# Patient Record
Sex: MALE | Race: WHITE | NOT HISPANIC OR LATINO | Employment: FULL TIME | URBAN - METROPOLITAN AREA
[De-identification: names, ages, dates, MRNs, and addresses within clinical notes are randomized per-mention and may not be internally consistent; named-entity substitution may affect disease eponyms.]

---

## 2020-11-13 ENCOUNTER — TRANSCRIBE ORDERS (OUTPATIENT)
Dept: ADMINISTRATIVE | Facility: HOSPITAL | Age: 40
End: 2020-11-13

## 2020-11-13 ENCOUNTER — HOSPITAL ENCOUNTER (OUTPATIENT)
Dept: RADIOLOGY | Facility: HOSPITAL | Age: 40
Discharge: HOME | End: 2020-11-13
Attending: INTERNAL MEDICINE
Payer: COMMERCIAL

## 2020-11-13 DIAGNOSIS — Z87.09 PERSONAL HISTORY OF UNSPECIFIED DISEASE OF RESPIRATORY SYSTEM: ICD-10-CM

## 2020-11-13 DIAGNOSIS — Z87.09 PERSONAL HISTORY OF UNSPECIFIED DISEASE OF RESPIRATORY SYSTEM: Primary | ICD-10-CM

## 2020-11-13 PROCEDURE — 71046 X-RAY EXAM CHEST 2 VIEWS: CPT

## 2021-11-04 ENCOUNTER — HOSPITAL ENCOUNTER (OUTPATIENT)
Dept: RADIOLOGY | Facility: HOSPITAL | Age: 41
Discharge: HOME | End: 2021-11-04
Payer: COMMERCIAL

## 2021-11-04 DIAGNOSIS — M54.50 LOW BACK PAIN, UNSPECIFIED BACK PAIN LATERALITY, UNSPECIFIED CHRONICITY, UNSPECIFIED WHETHER SCIATICA PRESENT: ICD-10-CM

## 2021-11-04 PROCEDURE — 72100 X-RAY EXAM L-S SPINE 2/3 VWS: CPT

## 2021-11-04 PROCEDURE — 73502 X-RAY EXAM HIP UNI 2-3 VIEWS: CPT

## 2021-11-30 VITALS — WEIGHT: 187.8 LBS | RESPIRATION RATE: 18 BRPM | HEIGHT: 70 IN | BODY MASS INDEX: 26.88 KG/M2

## 2021-11-30 DIAGNOSIS — M16.11 PRIMARY OSTEOARTHRITIS OF ONE HIP, RIGHT: Primary | ICD-10-CM

## 2021-11-30 PROCEDURE — 99204 OFFICE O/P NEW MOD 45 MIN: CPT | Performed by: ORTHOPAEDIC SURGERY

## 2021-11-30 RX ORDER — ROSUVASTATIN CALCIUM 10 MG/1
TABLET, COATED ORAL
COMMUNITY
Start: 2021-11-23

## 2021-11-30 RX ORDER — ERGOCALCIFEROL 1.25 MG/1
CAPSULE ORAL
COMMUNITY
Start: 2021-11-29

## 2021-11-30 RX ORDER — METHOCARBAMOL 750 MG/1
TABLET, FILM COATED ORAL EVERY 8 HOURS SCHEDULED
COMMUNITY
Start: 2021-10-06

## 2021-12-07 ENCOUNTER — OFFICE VISIT (OUTPATIENT)
Dept: OBGYN CLINIC | Facility: CLINIC | Age: 41
End: 2021-12-07
Payer: COMMERCIAL

## 2021-12-07 VITALS
HEART RATE: 85 BPM | WEIGHT: 191.4 LBS | DIASTOLIC BLOOD PRESSURE: 80 MMHG | SYSTOLIC BLOOD PRESSURE: 125 MMHG | HEIGHT: 70 IN | BODY MASS INDEX: 27.4 KG/M2

## 2021-12-07 DIAGNOSIS — M25.859 FEMORAL ACETABULAR IMPINGEMENT: ICD-10-CM

## 2021-12-07 DIAGNOSIS — M16.11 PRIMARY OSTEOARTHRITIS OF ONE HIP, RIGHT: Primary | ICD-10-CM

## 2021-12-07 PROCEDURE — 99213 OFFICE O/P EST LOW 20 MIN: CPT | Performed by: ORTHOPAEDIC SURGERY

## 2021-12-20 ENCOUNTER — HOSPITAL ENCOUNTER (OUTPATIENT)
Dept: RADIOLOGY | Facility: HOSPITAL | Age: 41
Discharge: HOME | End: 2021-12-20
Attending: ORTHOPAEDIC SURGERY | Admitting: RADIOLOGY
Payer: COMMERCIAL

## 2021-12-20 ENCOUNTER — HOSPITAL ENCOUNTER (OUTPATIENT)
Dept: MRI IMAGING | Facility: HOSPITAL | Age: 41
Discharge: HOME | End: 2021-12-20
Attending: ORTHOPAEDIC SURGERY
Payer: COMMERCIAL

## 2021-12-20 DIAGNOSIS — M25.859 FEMORAL ACETABULAR IMPINGEMENT: ICD-10-CM

## 2021-12-20 DIAGNOSIS — M16.11 PRIMARY OSTEOARTHRITIS OF ONE HIP, RIGHT: ICD-10-CM

## 2021-12-20 PROCEDURE — 73722 MRI JOINT OF LWR EXTR W/DYE: CPT

## 2021-12-20 PROCEDURE — 27095 INJECTION FOR HIP X-RAY: CPT

## 2021-12-20 PROCEDURE — 77002 NEEDLE LOCALIZATION BY XRAY: CPT

## 2021-12-20 PROCEDURE — G1004 CDSM NDSC: HCPCS

## 2021-12-20 PROCEDURE — A9585 GADOBUTROL INJECTION: HCPCS | Performed by: ORTHOPAEDIC SURGERY

## 2021-12-20 RX ORDER — SODIUM CHLORIDE 9 MG/ML
5 INJECTION INTRAVENOUS
Status: COMPLETED | OUTPATIENT
Start: 2021-12-20 | End: 2021-12-20

## 2021-12-20 RX ORDER — LIDOCAINE HYDROCHLORIDE 10 MG/ML
10 INJECTION, SOLUTION EPIDURAL; INFILTRATION; INTRACAUDAL; PERINEURAL
Status: COMPLETED | OUTPATIENT
Start: 2021-12-20 | End: 2021-12-20

## 2021-12-20 RX ADMIN — GADOBUTROL 0.2 ML: 604.72 INJECTION INTRAVENOUS at 11:01

## 2021-12-20 RX ADMIN — LIDOCAINE HYDROCHLORIDE 7 ML: 10 INJECTION, SOLUTION EPIDURAL; INFILTRATION; INTRACAUDAL; PERINEURAL at 11:02

## 2021-12-20 RX ADMIN — SODIUM CHLORIDE 12 ML: 9 INJECTION, SOLUTION INTRAMUSCULAR; INTRAVENOUS; SUBCUTANEOUS at 11:01

## 2021-12-20 RX ADMIN — IOHEXOL 3 ML: 300 INJECTION, SOLUTION INTRAVENOUS at 11:01

## 2021-12-28 VITALS
BODY MASS INDEX: 27.35 KG/M2 | WEIGHT: 191 LBS | DIASTOLIC BLOOD PRESSURE: 82 MMHG | HEIGHT: 70 IN | SYSTOLIC BLOOD PRESSURE: 119 MMHG | HEART RATE: 101 BPM

## 2021-12-28 DIAGNOSIS — M16.11 PRIMARY OSTEOARTHRITIS OF ONE HIP, RIGHT: Primary | ICD-10-CM

## 2021-12-28 PROCEDURE — 99213 OFFICE O/P EST LOW 20 MIN: CPT | Performed by: ORTHOPAEDIC SURGERY

## 2021-12-28 NOTE — TELEPHONE ENCOUNTER
Patient calls in stating that he saw Dr Dimitry Gil today and Dr Dimitry Gil advised him to call [facility] and advise them that Dr Dimitry Gil said it is okay for pt to receive cortisone injections  Pt states that they told him that is not how it works  Pt would need to have a consult with Dr Mike Brown  However, pt states he has already seen Dr Dimitry Gil twice  [facility] said that if there is not an appointment with Dr Dimitry Gil for a consult or a procedure, then they would have to meet with patient before he is able to get the cortisone injections  Patient is scheduled with [facility] for 1/12  They asked pt to see if Dr Dimitry Gil can change the visit to procedure  Please advise       Pt's cb #[phone]

## 2022-01-05 ENCOUNTER — TELEPHONE (OUTPATIENT)
Dept: OBGYN CLINIC | Facility: MEDICAL CENTER | Age: 42
End: 2022-01-05

## 2022-01-05 NOTE — TELEPHONE ENCOUNTER
Spoke to Stephanie Julian in [place]  She stated she will take care of this for the patient  What we have ordered for injection is fine it just needs to be sent to procedure scheduling  Thanks!

## 2022-01-05 NOTE — TELEPHONE ENCOUNTER
Marcelo Adjutant -Dr Hernan Infante nurse called stating that this patient is only have an injection- pls see Dr Ena Menezes note below and referral in pt chart

## 2022-01-05 NOTE — TELEPHONE ENCOUNTER
Patient is calling about the injection and here is his message and can someone call him back about this message  This is too much confusion on this message, can someone please call this patient to make sure he is ok to receive his shot as scheduled   # [phone]      "I have my pain management appointment   with Ambreen Adamson MD on January 12 at 9:30am  I was told at my appointment with you to call and request that it be the cortisone injection instead of a consult with the injection at a later date since you and I had already went over this option  I called but was told the only way that I could do this (get the shot without a consult with Dr Lee Ann Hernandez) was that if you requested the procedure   Please let them know that we have already gone over this and that the procedure is requested       Thank you,  Cordell Miller"

## 2022-01-06 NOTE — TELEPHONE ENCOUNTER
Pt called in to schedule a cortisone shot with Dr Estela Mcfarland as per Dr Ena Menezes  Please be advised thank you    Pt can be reached @ [phone]

## 2022-01-07 NOTE — TELEPHONE ENCOUNTER
Pt aware that [facility] is waiting to hear from insurance -once approval has been given, [facility] will call to schedule

## 2022-01-10 NOTE — TELEPHONE ENCOUNTER
Patient inquiring why does he need approval for injection?      Please advise    Thank you     [phone]

## 2022-01-11 NOTE — TELEPHONE ENCOUNTER
S/w pt and scheduled Right Hip injection for 1/13/22 9 am  Gave pre procedure instructions and masking//vaccine policy

## 2022-01-13 ENCOUNTER — HOSPITAL ENCOUNTER (OUTPATIENT)
Dept: RADIOLOGY | Facility: MEDICAL CENTER | Age: 42
Discharge: HOME | End: 2022-01-13
Attending: PHYSICAL MEDICINE & REHABILITATION
Payer: COMMERCIAL

## 2022-01-13 VITALS
OXYGEN SATURATION: 98 % | SYSTOLIC BLOOD PRESSURE: 125 MMHG | DIASTOLIC BLOOD PRESSURE: 81 MMHG | TEMPERATURE: 97.7 F | RESPIRATION RATE: 18 BRPM | HEART RATE: 74 BPM

## 2022-01-13 DIAGNOSIS — M16.11 PRIMARY OSTEOARTHRITIS OF RIGHT HIP: ICD-10-CM

## 2022-01-13 PROCEDURE — 20610 DRAIN/INJ JOINT/BURSA W/O US: CPT | Performed by: PHYSICAL MEDICINE & REHABILITATION

## 2022-01-13 PROCEDURE — 77002 NEEDLE LOCALIZATION BY XRAY: CPT | Performed by: PHYSICAL MEDICINE & REHABILITATION

## 2022-01-13 RX ORDER — LIDOCAINE HYDROCHLORIDE 10 MG/ML
5 INJECTION, SOLUTION EPIDURAL; INFILTRATION; INTRACAUDAL; PERINEURAL ONCE
Status: COMPLETED | OUTPATIENT
Start: 2022-01-13 | End: 2022-01-13

## 2022-01-13 RX ORDER — BUPIVACAINE HCL/PF 2.5 MG/ML
10 VIAL (ML) INJECTION ONCE
Status: COMPLETED | OUTPATIENT
Start: 2022-01-13 | End: 2022-01-13

## 2022-01-13 RX ORDER — METHYLPREDNISOLONE ACETATE 80 MG/ML
80 INJECTION, SUSPENSION INTRA-ARTICULAR; INTRALESIONAL; INTRAMUSCULAR; PARENTERAL; SOFT TISSUE ONCE
Status: COMPLETED | OUTPATIENT
Start: 2022-01-13 | End: 2022-01-13

## 2022-01-13 RX ADMIN — IOHEXOL 1 ML: 300 INJECTION, SOLUTION INTRAVENOUS at 11:47

## 2022-01-13 RX ADMIN — Medication 3 ML: at 11:47

## 2022-01-13 RX ADMIN — LIDOCAINE HYDROCHLORIDE 2 ML: 10 INJECTION, SOLUTION EPIDURAL; INFILTRATION; INTRACAUDAL; PERINEURAL at 11:46

## 2022-01-13 RX ADMIN — METHYLPREDNISOLONE ACETATE 80 MG: 80 INJECTION, SUSPENSION INTRA-ARTICULAR; INTRALESIONAL; INTRAMUSCULAR; PARENTERAL; SOFT TISSUE at 11:47

## 2022-01-13 NOTE — TELEPHONE ENCOUNTER
Patient coming for hip injection today at 11 am with Dr Joleen Bhat at [facility]  Pt aware and agreeable

## 2022-01-13 NOTE — TELEPHONE ENCOUNTER
Patient is at [facility] waiting because he had to arrange a ride  Needs to know as soon as possible   ty

## 2022-01-13 NOTE — H&P
History of Present Illness: The patient is a 39 y o  male who presents with complaints of right hip pain    Patient Active Problem List   Diagnosis    Primary osteoarthritis of one hip, right       Past Medical History:   Diagnosis Date    Fractures     wrist    Hypercholesteremia        Past Surgical History:   Procedure Laterality Date    FL INJECTION RIGHT HIP (ARTHROGRAM)  12/20/2021    KNEE SURGERY Right     meniscus repair         Current Outpatient Medications:     ergocalciferol (VITAMIN D2) 50,000 units, , Disp: , Rfl:     methocarbamol (ROBAXIN) 750 mg tablet, every 8 (eight) hours  , Disp: , Rfl:     rosuvastatin (CRESTOR) 10 MG tablet, , Disp: , Rfl:     Current Facility-Administered Medications:     bupivacaine (PF) (MARCAINE) 0 25 % injection 10 mL, 10 mL, Intra-articular, Once, Hope Heal, DO    iohexol (OMNIPAQUE) 300 mg/mL injection 50 mL, 50 mL, Intra-articular, Once, Hope Heal, DO    lidocaine (PF) (XYLOCAINE-MPF) 1 % injection 5 mL, 5 mL, Infiltration, Once, Hope Heal, DO    methylPREDNISolone acetate (DEPO-MEDROL) injection 80 mg, 80 mg, Intra-articular, Once, Hope Heal, DO    No Known Allergies    Physical Exam: There were no vitals filed for this visit  General: Awake, Alert, Oriented x 3, Mood and affect appropriate  Respiratory: Respirations even and unlabored  Cardiovascular: Peripheral pulses intact; no edema  Musculoskeletal Exam:  Tenderness to palpation right lateral hip and medial groin    ASA Score: 2         Assessment:   1   Primary osteoarthritis of right hip        Plan: Right Hip intra-articular injection of cortisone under fluoro

## 2022-01-13 NOTE — DISCHARGE INSTRUCTIONS

## 2022-01-13 NOTE — TELEPHONE ENCOUNTER
Patient was scheduled today but procedure was cancelled due to no RAD [facility] per , he is not interested in driving back to [facility] would like to speak with Dr Eusebia Young office to be scheduled at another location to get the procedure done today   I transferred to 0    Please advise     Thank you     [phone]

## 2022-01-20 ENCOUNTER — TELEPHONE (OUTPATIENT)
Dept: PAIN MEDICINE | Facility: CLINIC | Age: 42
End: 2022-01-20

## 2022-01-20 NOTE — TELEPHONE ENCOUNTER
1st attempt    Left message for pt to call back with % of improvement and /10 pain level    R hip inj   1/13/22

## 2022-02-12 DIAGNOSIS — M16.11 PRIMARY OSTEOARTHRITIS OF ONE HIP, RIGHT: Primary | ICD-10-CM

## 2022-02-15 ENCOUNTER — APPOINTMENT (OUTPATIENT)
Dept: RADIOLOGY | Facility: CLINIC | Age: 42
End: 2022-02-15
Payer: COMMERCIAL

## 2022-02-15 ENCOUNTER — OFFICE VISIT (OUTPATIENT)
Dept: OBGYN CLINIC | Facility: CLINIC | Age: 42
End: 2022-02-15
Payer: COMMERCIAL

## 2022-02-15 VITALS
SYSTOLIC BLOOD PRESSURE: 124 MMHG | BODY MASS INDEX: 26.11 KG/M2 | WEIGHT: 182.4 LBS | HEIGHT: 70 IN | DIASTOLIC BLOOD PRESSURE: 80 MMHG | HEART RATE: 85 BPM

## 2022-02-15 DIAGNOSIS — M16.11 PRIMARY OSTEOARTHRITIS OF ONE HIP, RIGHT: ICD-10-CM

## 2022-02-15 DIAGNOSIS — M16.11 PRIMARY OSTEOARTHRITIS OF ONE HIP, RIGHT: Primary | ICD-10-CM

## 2022-02-15 PROCEDURE — 99214 OFFICE O/P EST MOD 30 MIN: CPT | Performed by: ORTHOPAEDIC SURGERY

## 2022-02-15 PROCEDURE — 73502 X-RAY EXAM HIP UNI 2-3 VIEWS: CPT

## 2022-02-15 NOTE — PROGRESS NOTES
Orthopaedics Office Visit - Follow-up Patient Visit    ASSESSMENT/PLAN:    Assessment:   Right hip osteoarthritis, labrum tear, osteochondral lesions    Plan:   -X-rays were reviewed with the patient at today's visit  -Continue with injections and HEP  -Hip preservation doctors at [facility] information provided to the patient at today's visit - I would recommend patient seek their opinion before any total hip arthroplasty was performed by myself  -Activities as tolerated  -Work note provided to patient at today's visit that he may work at a [employer] with no restrictions    To Do Next Visit:  Follow-up as needed    _____________________________________________________  CHIEF COMPLAINT:  No chief complaint on file  SUBJECTIVE:  Thee Ulloa is a 39 y o  male who presents to the office today for follow-up evaluation of his severe right hip osteoarthritis  He underwent a fluoro guided right hip intra-articular injection by Dr Chelsi Peterson on 1/13/2022  He states that the injection provided him with pain relief  He states that he notes a 40% to 60% improvement since the injection  He denies any pain with putting on socks and shoes, getting up from a seated position and getting in and out of a car  He states that he has been performing a home exercise program daily      PAST MEDICAL HISTORY:  Past Medical History:   Diagnosis Date    Fractures     wrist    Hypercholesteremia        PAST SURGICAL HISTORY:  Past Surgical History:   Procedure Laterality Date    FL INJECTION RIGHT HIP (ARTHROGRAM)  12/20/2021    KNEE SURGERY Right     meniscus repair       FAMILY HISTORY:  Family History   Problem Relation Age of Onset    Hyperlipidemia Mother     Heart disease Father        SOCIAL HISTORY:  Social History     Tobacco Use    Smoking status: Never Smoker    Smokeless tobacco: Never Used   Vaping Use    Vaping Use: Never used   Substance Use Topics    Alcohol use: Not Currently    Drug use: Not Currently       MEDICATIONS:    Current Outpatient Medications:     ergocalciferol (VITAMIN D2) 50,000 units, , Disp: , Rfl:     methocarbamol (ROBAXIN) 750 mg tablet, every 8 (eight) hours  , Disp: , Rfl:     rosuvastatin (CRESTOR) 10 MG tablet, , Disp: , Rfl:     ALLERGIES:  No Known Allergies    REVIEW OF SYSTEMS:  MSK: Right Hip  Neuro: Intact  Pertinent items are otherwise noted in HPI  A comprehensive review of systems was otherwise negative  LABS:  HgA1c: No results found for: HGBA1C  BMP: No results found for: GLUCOSE, CALCIUM, NA, K, CO2, CL, BUN, CREATININE  CBC: No components found for: CBC    _____________________________________________________  PHYSICAL EXAMINATION:  Vital signs: There were no vitals taken for this visit  General: No acute distress, awake and alert  Psychiatric: Mood and affect appear appropriate  HEENT: Trachea Midline, No torticollis, no apparent facial trauma  Cardiovascular: No audible murmurs; Extremities appear perfused  Pulmonary: No audible wheezing or stridor  Skin: No open lesions; see further details (if any) below    MUSCULOSKELETAL EXAMINATION:  Extremities:  Right Hip  5/5 hip flexion  Pain-free range of motion  Neurovascularly intact    _____________________________________________________  STUDIES REVIEWED:  I personally reviewed the images and interpretation is as follows:    X-rays performed in the office today of his right hip demonstrates severe right hip degenerative changes  There is evidence of joint space narrowing  There are no acute fractures, dislocations, lytic or blastic lesions      PROCEDURES PERFORMED:  Procedures    Scribe Attestation    I,:  Luke Hall am acting as a scribe while in the presence of the attending physician :       I,:  Oswald Whittington MD personally performed the services described in this documentation    as scribed in my presence :

## 2022-02-15 NOTE — LETTER
February 15, 2022     Patient: Rex Rasmussen   YOB: 1980   Date of Visit: 2/15/2022       To Whom it May Concern:    Virginia Gonzalez is under my professional care  He was seen in my office on 2/15/2022  He may work at [employer] with no restrictions  If you have any questions or concerns, please don't hesitate to call  Sincerely,          Frances Euceda MD        CC: Jhonny Murray

## 2022-05-05 ENCOUNTER — APPOINTMENT (OUTPATIENT)
Dept: RADIOLOGY | Facility: CLINIC | Age: 42
End: 2022-05-05
Payer: OTHER MISCELLANEOUS

## 2022-05-05 ENCOUNTER — OCCMED (OUTPATIENT)
Dept: URGENT CARE | Facility: CLINIC | Age: 42
End: 2022-05-05
Payer: OTHER MISCELLANEOUS

## 2022-05-05 ENCOUNTER — APPOINTMENT (OUTPATIENT)
Dept: LAB | Facility: CLINIC | Age: 42
End: 2022-05-05
Payer: COMMERCIAL

## 2022-05-05 DIAGNOSIS — M25.531 ACUTE PAIN OF RIGHT WRIST: Primary | ICD-10-CM

## 2022-05-05 DIAGNOSIS — Z00.00 ROUTINE GENERAL MEDICAL EXAMINATION AT A HEALTH CARE FACILITY: ICD-10-CM

## 2022-05-05 DIAGNOSIS — E78.2 MIXED HYPERLIPIDEMIA: ICD-10-CM

## 2022-05-05 DIAGNOSIS — M25.531 ACUTE PAIN OF RIGHT WRIST: ICD-10-CM

## 2022-05-05 DIAGNOSIS — E55.9 VITAMIN D DEFICIENCY: ICD-10-CM

## 2022-05-05 DIAGNOSIS — D64.9 ANEMIA, UNSPECIFIED TYPE: ICD-10-CM

## 2022-05-05 DIAGNOSIS — E11.9 DIABETES MELLITUS WITHOUT COMPLICATION (HCC): ICD-10-CM

## 2022-05-05 LAB
25(OH)D3 SERPL-MCNC: 101.8 NG/ML (ref 30–100)
ALBUMIN SERPL BCP-MCNC: 4.4 G/DL (ref 3.5–5)
ALP SERPL-CCNC: 148 U/L (ref 46–116)
ALT SERPL W P-5'-P-CCNC: 36 U/L (ref 12–78)
ANION GAP SERPL CALCULATED.3IONS-SCNC: 2 MMOL/L (ref 4–13)
AST SERPL W P-5'-P-CCNC: 23 U/L (ref 5–45)
BILIRUB SERPL-MCNC: 0.52 MG/DL (ref 0.2–1)
BUN SERPL-MCNC: 15 MG/DL (ref 5–25)
CALCIUM SERPL-MCNC: 9.7 MG/DL (ref 8.3–10.1)
CHLORIDE SERPL-SCNC: 107 MMOL/L (ref 100–108)
CHOLEST SERPL-MCNC: 122 MG/DL
CO2 SERPL-SCNC: 31 MMOL/L (ref 21–32)
CREAT SERPL-MCNC: 1.25 MG/DL (ref 0.6–1.3)
ERYTHROCYTE [DISTWIDTH] IN BLOOD BY AUTOMATED COUNT: 11.9 % (ref 11.6–15.1)
EST. AVERAGE GLUCOSE BLD GHB EST-MCNC: 120 MG/DL
GFR SERPL CREATININE-BSD FRML MDRD: 71 ML/MIN/1.73SQ M
GLUCOSE P FAST SERPL-MCNC: 88 MG/DL (ref 65–99)
HBA1C MFR BLD: 5.8 %
HCT VFR BLD AUTO: 46 % (ref 36.5–49.3)
HDLC SERPL-MCNC: 40 MG/DL
HGB BLD-MCNC: 15 G/DL (ref 12–17)
LDLC SERPL CALC-MCNC: 65 MG/DL (ref 0–100)
MCH RBC QN AUTO: 29.1 PG (ref 26.8–34.3)
MCHC RBC AUTO-ENTMCNC: 32.6 G/DL (ref 31.4–37.4)
MCV RBC AUTO: 89 FL (ref 82–98)
NONHDLC SERPL-MCNC: 82 MG/DL
PLATELET # BLD AUTO: 212 THOUSANDS/UL (ref 149–390)
PMV BLD AUTO: 11.3 FL (ref 8.9–12.7)
POTASSIUM SERPL-SCNC: 4.1 MMOL/L (ref 3.5–5.3)
PROT SERPL-MCNC: 7.8 G/DL (ref 6.4–8.2)
RBC # BLD AUTO: 5.15 MILLION/UL (ref 3.88–5.62)
SODIUM SERPL-SCNC: 140 MMOL/L (ref 136–145)
TRIGL SERPL-MCNC: 83 MG/DL
WBC # BLD AUTO: 6.57 THOUSAND/UL (ref 4.31–10.16)

## 2022-05-05 PROCEDURE — 80053 COMPREHEN METABOLIC PANEL: CPT

## 2022-05-05 PROCEDURE — 36415 COLL VENOUS BLD VENIPUNCTURE: CPT

## 2022-05-05 PROCEDURE — 80061 LIPID PANEL: CPT

## 2022-05-05 PROCEDURE — 73110 X-RAY EXAM OF WRIST: CPT

## 2022-05-05 PROCEDURE — 85027 COMPLETE CBC AUTOMATED: CPT

## 2022-05-05 PROCEDURE — 99283 EMERGENCY DEPT VISIT LOW MDM: CPT

## 2022-05-05 PROCEDURE — 82306 VITAMIN D 25 HYDROXY: CPT

## 2022-05-05 PROCEDURE — G0382 LEV 3 HOSP TYPE B ED VISIT: HCPCS

## 2022-05-05 PROCEDURE — 83036 HEMOGLOBIN GLYCOSYLATED A1C: CPT

## 2022-05-10 ENCOUNTER — OCCMED (OUTPATIENT)
Dept: URGENT CARE | Facility: CLINIC | Age: 42
End: 2022-05-10
Payer: OTHER MISCELLANEOUS

## 2022-05-10 DIAGNOSIS — S66.911A SPRAIN AND STRAIN OF RIGHT WRIST: Primary | ICD-10-CM

## 2022-05-10 DIAGNOSIS — S63.501A SPRAIN AND STRAIN OF RIGHT WRIST: Primary | ICD-10-CM

## 2022-05-10 PROCEDURE — 99213 OFFICE O/P EST LOW 20 MIN: CPT

## 2022-05-16 ENCOUNTER — OCCMED (OUTPATIENT)
Dept: URGENT CARE | Facility: CLINIC | Age: 42
End: 2022-05-16
Payer: OTHER MISCELLANEOUS

## 2022-05-16 DIAGNOSIS — S63.501D SPRAIN OF RIGHT FOREARM, SUBSEQUENT ENCOUNTER: Primary | ICD-10-CM

## 2022-05-16 PROCEDURE — 99213 OFFICE O/P EST LOW 20 MIN: CPT

## 2024-03-21 ENCOUNTER — OFFICE VISIT (OUTPATIENT)
Dept: URGENT CARE | Facility: CLINIC | Age: 44
End: 2024-03-21
Payer: COMMERCIAL

## 2024-03-21 VITALS
SYSTOLIC BLOOD PRESSURE: 122 MMHG | TEMPERATURE: 97.7 F | DIASTOLIC BLOOD PRESSURE: 87 MMHG | OXYGEN SATURATION: 99 % | HEART RATE: 75 BPM | RESPIRATION RATE: 18 BRPM

## 2024-03-21 DIAGNOSIS — H10.33 ACUTE CONJUNCTIVITIS OF BOTH EYES, UNSPECIFIED ACUTE CONJUNCTIVITIS TYPE: Primary | ICD-10-CM

## 2024-03-21 PROCEDURE — 99213 OFFICE O/P EST LOW 20 MIN: CPT

## 2024-03-21 RX ORDER — OFLOXACIN 3 MG/ML
1 SOLUTION/ DROPS OPHTHALMIC 4 TIMES DAILY
Qty: 5 ML | Refills: 0 | Status: SHIPPED | OUTPATIENT
Start: 2024-03-21 | End: 2024-03-28

## 2024-03-21 NOTE — LETTER
March 21, 2024     Patient: Lacho Johnson   YOB: 1980   Date of Visit: 3/21/2024       To Whom it May Concern:    Lacho Johnson was seen in my clinic on 3/21/2024.     If you have any questions or concerns, please don't hesitate to call.         Sincerely,          WAYNE Todd        CC: No Recipients

## 2024-03-21 NOTE — PROGRESS NOTES
"  [facility]        NAME: Lacho Johnson is a 43 y.o. male  : 1980    MRN: [id number]  DATE: 2024  TIME: 10:53 AM    Assessment and Plan   Acute conjunctivitis of both eyes, unspecified acute conjunctivitis type [H10.33]  1. Acute conjunctivitis of both eyes, unspecified acute conjunctivitis type  ofloxacin (OCUFLOX) 0.3 % ophthalmic solution        Work note given.     Patient Instructions     Ofloxacin as directed.   Apply cold or warm compresses  Avoid touching eyes  Wash hands frequently  Change pillowcases daily  Follow up with ophthalmologist if symptoms do not resolve    Follow up with PCP in 3-5 days.  Proceed to the ER with worsening symptoms.     Chief Complaint     Chief Complaint   Patient presents with    Conjunctivitis     Son with pink eye last week. Is on tx. States he woke up today with eye \"glued shut\"         History of Present Illness       The patient presents today with complaints of L eye redness, irritation, wateriness, crustiness that started this morning. He also noted some nasal congestion this morning. Denies fever/chills, or any other symptoms. He feels like he is also getting some irritation into his R eye. His son was recently sick with URI symptoms and pink eye. He does wear contacts, but states he has not worn them recently. He has not taken anything OTC for his symptoms.         Review of Systems   Review of Systems   Constitutional:  Negative for chills and fever.   HENT:  Positive for congestion and rhinorrhea. Negative for ear pain, sinus pressure, sinus pain and sore throat.    Eyes:  Positive for discharge and redness. Negative for photophobia, pain, itching and visual disturbance.        L eye with mild irritation to R eye   Respiratory:  Negative for cough.    Gastrointestinal:  Negative for abdominal pain, diarrhea, nausea and vomiting.   Musculoskeletal:  Negative for myalgias.         Current Medications       Current Outpatient Medications:    "  ofloxacin (OCUFLOX) 0.3 % ophthalmic solution, Administer 1 drop to both eyes 4 (four) times a day for 7 days, Disp: 5 mL, Rfl: 0    rosuvastatin (CRESTOR) 10 MG tablet, , Disp: , Rfl:     Current Allergies     Allergies as of 03/21/2024    (No Known Allergies)            The following portions of the patient's history were reviewed and updated as appropriate: allergies, current medications, past family history, past medical history, past social history, past surgical history and problem list.     Past Medical History:   Diagnosis Date    Fractures     wrist    Hypercholesteremia        Past Surgical History:   Procedure Laterality Date    FL INJECTION RIGHT HIP (ARTHROGRAM)  12/20/2021    KNEE SURGERY Right     meniscus repair       Family History   Problem Relation Age of Onset    Hyperlipidemia Mother     Heart disease Father          Medications have been verified.        Objective   /87   Pulse 75   Temp 97.7 °F (36.5 °C)   Resp 18   SpO2 99%        Physical Exam     Physical Exam  Vitals and nursing note reviewed.   Constitutional:       General: He is not in acute distress.     Appearance: Normal appearance. He is not ill-appearing.   HENT:      Head: Normocephalic and atraumatic.      Right Ear: Tympanic membrane, ear canal and external ear normal.      Left Ear: Tympanic membrane, ear canal and external ear normal.      Nose: Congestion present. No rhinorrhea.      Mouth/Throat:      Lips: Pink.      Mouth: Mucous membranes are moist.      Pharynx: Posterior oropharyngeal erythema present. No oropharyngeal exudate.      Tonsils: No tonsillar exudate.   Eyes:      General: Vision grossly intact.      Extraocular Movements: Extraocular movements intact.      Conjunctiva/sclera:      Right eye: Right conjunctiva is not injected. No chemosis, exudate or hemorrhage.     Left eye: Left conjunctiva is injected. No chemosis, exudate or hemorrhage.     Pupils: Pupils are equal, round, and reactive to  light.   Cardiovascular:      Rate and Rhythm: Normal rate and regular rhythm.      Heart sounds: Normal heart sounds. No murmur heard.  Pulmonary:      Effort: Pulmonary effort is normal. No respiratory distress.      Breath sounds: Normal breath sounds. No decreased air movement. No decreased breath sounds, wheezing, rhonchi or rales.   Musculoskeletal:         General: Normal range of motion.      Cervical back: Normal range of motion.   Lymphadenopathy:      Cervical: No cervical adenopathy.   Skin:     General: Skin is warm.      Findings: No rash.   Neurological:      Mental Status: He is alert and oriented to person, place, and time.      Motor: Motor function is intact.      Gait: Gait is intact.   Psychiatric:         Attention and Perception: Attention normal.         Mood and Affect: Mood normal.

## 2024-03-21 NOTE — PATIENT INSTRUCTIONS
Ofloxacin as directed.   Apply cold or warm compresses  Avoid touching eyes  Wash hands frequently  Change pillowcases daily  Follow up with ophthalmologist if symptoms do not resolve    Follow up with PCP in 3-5 days.  Proceed to the ER with worsening symptoms.

## 2025-02-18 ENCOUNTER — OFFICE VISIT (OUTPATIENT)
Dept: GASTROENTEROLOGY | Facility: CLINIC | Age: 45
End: 2025-02-18
Payer: COMMERCIAL

## 2025-02-18 ENCOUNTER — APPOINTMENT (OUTPATIENT)
Dept: LAB | Facility: CLINIC | Age: 45
End: 2025-02-18
Payer: COMMERCIAL

## 2025-02-18 VITALS
TEMPERATURE: 97.5 F | BODY MASS INDEX: 24.77 KG/M2 | HEIGHT: 70 IN | HEART RATE: 81 BPM | OXYGEN SATURATION: 98 % | DIASTOLIC BLOOD PRESSURE: 80 MMHG | WEIGHT: 173 LBS | SYSTOLIC BLOOD PRESSURE: 123 MMHG

## 2025-02-18 DIAGNOSIS — R74.8 ELEVATED ALKALINE PHOSPHATASE LEVEL: Primary | ICD-10-CM

## 2025-02-18 DIAGNOSIS — Z80.0 FAMILY HISTORY OF MALIGNANT NEOPLASM OF EXTRAHEPATIC BILE DUCTS: ICD-10-CM

## 2025-02-18 DIAGNOSIS — Z12.11 SCREENING FOR COLON CANCER: ICD-10-CM

## 2025-02-18 DIAGNOSIS — R74.8 ELEVATED ALKALINE PHOSPHATASE LEVEL: ICD-10-CM

## 2025-02-18 LAB
ALBUMIN SERPL BCG-MCNC: 4.6 G/DL (ref 3.5–5)
ALP SERPL-CCNC: 154 U/L (ref 34–104)
ALT SERPL W P-5'-P-CCNC: 20 U/L (ref 7–52)
ANION GAP SERPL CALCULATED.3IONS-SCNC: 8 MMOL/L (ref 4–13)
AST SERPL W P-5'-P-CCNC: 16 U/L (ref 13–39)
BASOPHILS # BLD AUTO: 0.04 THOUSANDS/ΜL (ref 0–0.1)
BASOPHILS NFR BLD AUTO: 1 % (ref 0–1)
BILIRUB SERPL-MCNC: 0.4 MG/DL (ref 0.2–1)
BUN SERPL-MCNC: 17 MG/DL (ref 5–25)
CALCIUM SERPL-MCNC: 9.8 MG/DL (ref 8.4–10.2)
CHLORIDE SERPL-SCNC: 101 MMOL/L (ref 96–108)
CO2 SERPL-SCNC: 30 MMOL/L (ref 21–32)
CREAT SERPL-MCNC: 1.16 MG/DL (ref 0.6–1.3)
EOSINOPHIL # BLD AUTO: 0.54 THOUSAND/ΜL (ref 0–0.61)
EOSINOPHIL NFR BLD AUTO: 8 % (ref 0–6)
ERYTHROCYTE [DISTWIDTH] IN BLOOD BY AUTOMATED COUNT: 12.1 % (ref 11.6–15.1)
GFR SERPL CREATININE-BSD FRML MDRD: 76 ML/MIN/1.73SQ M
GGT SERPL-CCNC: 13 U/L (ref 9–64)
GLUCOSE P FAST SERPL-MCNC: 126 MG/DL (ref 65–99)
HCT VFR BLD AUTO: 46.4 % (ref 36.5–49.3)
HGB BLD-MCNC: 15.1 G/DL (ref 12–17)
IGA SERPL-MCNC: 115 MG/DL (ref 66–433)
IGG SERPL-MCNC: 1120 MG/DL (ref 635–1741)
IGM SERPL-MCNC: 64 MG/DL (ref 45–281)
IMM GRANULOCYTES # BLD AUTO: 0.01 THOUSAND/UL (ref 0–0.2)
IMM GRANULOCYTES NFR BLD AUTO: 0 % (ref 0–2)
INR PPP: 1 (ref 0.85–1.19)
LYMPHOCYTES # BLD AUTO: 1.16 THOUSANDS/ΜL (ref 0.6–4.47)
LYMPHOCYTES NFR BLD AUTO: 16 % (ref 14–44)
MCH RBC QN AUTO: 29.2 PG (ref 26.8–34.3)
MCHC RBC AUTO-ENTMCNC: 32.5 G/DL (ref 31.4–37.4)
MCV RBC AUTO: 90 FL (ref 82–98)
MONOCYTES # BLD AUTO: 0.47 THOUSAND/ΜL (ref 0.17–1.22)
MONOCYTES NFR BLD AUTO: 7 % (ref 4–12)
NEUTROPHILS # BLD AUTO: 5.01 THOUSANDS/ΜL (ref 1.85–7.62)
NEUTS SEG NFR BLD AUTO: 68 % (ref 43–75)
NRBC BLD AUTO-RTO: 0 /100 WBCS
PLATELET # BLD AUTO: 245 THOUSANDS/UL (ref 149–390)
PMV BLD AUTO: 11.3 FL (ref 8.9–12.7)
POTASSIUM SERPL-SCNC: 4.1 MMOL/L (ref 3.5–5.3)
PROT SERPL-MCNC: 7 G/DL (ref 6.4–8.4)
PROTHROMBIN TIME: 13.5 SECONDS (ref 12.3–15)
RBC # BLD AUTO: 5.17 MILLION/UL (ref 3.88–5.62)
SODIUM SERPL-SCNC: 139 MMOL/L (ref 135–147)
WBC # BLD AUTO: 7.23 THOUSAND/UL (ref 4.31–10.16)

## 2025-02-18 PROCEDURE — 86381 MITOCHONDRIAL ANTIBODY EACH: CPT

## 2025-02-18 PROCEDURE — 99243 OFF/OP CNSLTJ NEW/EST LOW 30: CPT | Performed by: FAMILY MEDICINE

## 2025-02-18 PROCEDURE — 85025 COMPLETE CBC W/AUTO DIFF WBC: CPT

## 2025-02-18 PROCEDURE — 85610 PROTHROMBIN TIME: CPT

## 2025-02-18 PROCEDURE — 86225 DNA ANTIBODY NATIVE: CPT

## 2025-02-18 PROCEDURE — 36415 COLL VENOUS BLD VENIPUNCTURE: CPT

## 2025-02-18 PROCEDURE — 87340 HEPATITIS B SURFACE AG IA: CPT

## 2025-02-18 PROCEDURE — 86803 HEPATITIS C AB TEST: CPT

## 2025-02-18 PROCEDURE — 82784 ASSAY IGA/IGD/IGG/IGM EACH: CPT

## 2025-02-18 PROCEDURE — 86015 ACTIN ANTIBODY EACH: CPT

## 2025-02-18 PROCEDURE — 86704 HEP B CORE ANTIBODY TOTAL: CPT

## 2025-02-18 PROCEDURE — 86038 ANTINUCLEAR ANTIBODIES: CPT

## 2025-02-18 PROCEDURE — 80053 COMPREHEN METABOLIC PANEL: CPT

## 2025-02-18 PROCEDURE — 86705 HEP B CORE ANTIBODY IGM: CPT

## 2025-02-18 PROCEDURE — 82977 ASSAY OF GGT: CPT

## 2025-02-18 RX ORDER — FLUTICASONE PROPIONATE 50 MCG
2 SPRAY, SUSPENSION (ML) NASAL DAILY
COMMUNITY
Start: 2024-11-11

## 2025-02-18 NOTE — PROGRESS NOTES
Name: Lacho Johnson      : 1980      MRN: [id number]  Encounter Provider: Brigid Romero PA-C  Encounter Date: 2025   Encounter department: [facility] [place]  :  Assessment & Plan  Elevated alkaline phosphatase level  Patient with a mild and intermittently elevated alkaline phosphatase since at least . The remainder of his liver chemistries are normal.  His alkaline phosphatase has more recently been uptrending prompting isoenzymes showing a peak alk phos of 193 and  s/o hepatic origin. He also had an RUQ US showing an unremarkable liver, gallbladder and biliary tree. +FH of bile duct cancer including a maternal aunt dx at 51 y/o. He is asymptomatic.    The etiology of his elevated alkaline phosphatase is currently unclear. No radiographic evidence of obvious hepatobiliary obstruction and no medication/herbal supplements to suggest DILI. Plan for serologic workup including a GGT, an ANDI, AMA, ASMA and IgG immunoglobulins particularly to assess for PBC (+/- AIH overlap) and a chronic hepatitis panel to screen for hepatitis B and C. He will also complete an MRI/MRCP to ensure no hepatobiliary obstruction (mass, stone, stricture etc.) given the uptrending nature of his alk phos and +FH of biliary cancer.    In the meantime, recommend that he avoid interval weight gain, hepatotoxic medications and EtOH use entirely. Further recommendations pending the completion of his workup.    Orders:  •  CBC and differential; Future  •  Comprehensive metabolic panel; Future  •  Protime-INR; Future  •  Gamma GT; Future  •  Antimitochondrial antibody; Future  •  Anti-smooth muscle antibody, IgG; Future  •  IgG, IgA, IgM; Future  •  ANDI Screen w/Reflex Cascade; Future  •  Chronic Hepatitis Panel; Future  •  MRI abdomen w wo contrast and mrcp; Future    Family history of malignant neoplasm of extrahepatic bile ducts  Refer to A/P above.       Screening for colon  cancer  Patient will be due for CRC screening July 2025.       Follow-up in 3 months or sooner if necessary.      History of Present Illness   HPI    Lacho Johnson is a 44 y.o. male with PMH significant for HLD, a lesion of the uvula and osteoarthritis s/p partial right hip replacement who presents today for a Tatian regarding an elevated alkaline phosphatase.    Lacho was ordered for routine labs by his PCP and found to have a mildly elevated alkaline phosphatase. Per chart review, this has been intermittently elevated since at least 2022.  The remainder of his liver chemistries have historically been within normal limits. He has subsequent CMP which showed an uptrending alkaline phosphatase (164) prompting alk phos isoenzymes suggesting that this is of hepatic origin. He had a complete abdominal ultrasound (1/13/2025) showing a normal-appearing liver, gallbladder and biliary tree. He was incidentally noted to have a 1.3 cm right renal cortical cyst.    Denies a personal history of chronic liver disease. He has a mother w/ fatty liver and maternal aunt dx w/ bile-duct CA at 53 y/o. Denies any known past or current infection with viral hepatitis. Denies being treated for any autoimmune conditions. Denies taking any herbal supplements, performance-enhancing drugs, or OTC/prescription medications not reflected on their med list. Denies EtOH use.     Denies fevers/chills, pruritus, confusion, dark urine, bruising easily, yellowing of the eyes and/or skin, abdominal pain or distension, overt GI bleeding or unintentional weight loss.     Started Crestor 2-3 years ago.   He was checked for heavy metals after hip replacement (11/2022) and found to have high cobalt was told this was within acceptable range.       History obtained from: patient    Review of Systems   All other systems reviewed and are negative.    Pertinent Medical History     Medical History Reviewed by provider this encounter:  Tobacco  Allergies   "Meds  Problems  Med Hx  Surg Hx  Fam Hx     .  Past Medical History   Past Medical History:   Diagnosis Date   • Fractures     wrist   • Hypercholesteremia      Past Surgical History:   Procedure Laterality Date   • FL INJECTION RIGHT HIP (ARTHROGRAM)  12/20/2021   • KNEE SURGERY Right     meniscus repair     Family History   Problem Relation Age of Onset   • Hyperlipidemia Mother    • Heart disease Father    • Hyperlipidemia Father    • Hyperlipidemia Sister       reports that he has never smoked. He has never used smokeless tobacco. He reports that he does not currently use alcohol. He reports that he does not use drugs.  Current Outpatient Medications   Medication Instructions   • fluticasone (FLONASE) 50 mcg/act nasal spray 2 sprays, Daily   • rosuvastatin (CRESTOR) 10 MG tablet No dose, route, or frequency recorded.   No Known Allergies   Current Outpatient Medications on File Prior to Visit   Medication Sig Dispense Refill   • rosuvastatin (CRESTOR) 10 MG tablet      • fluticasone (FLONASE) 50 mcg/act nasal spray 2 sprays into each nostril daily (Patient not taking: Reported on 2/18/2025)       No current facility-administered medications on file prior to visit.      Social History     Tobacco Use   • Smoking status: Never   • Smokeless tobacco: Never   Vaping Use   • Vaping status: Never Used   Substance and Sexual Activity   • Alcohol use: Not Currently   • Drug use: Never   • Sexual activity: Yes     Partners: Female     Birth control/protection: Other     Comment: IUD removed currently attepmting for pregnancy        Objective   /80 (BP Location: Right arm, Patient Position: Sitting, Cuff Size: Standard)   Pulse 81   Temp 97.5 °F (36.4 °C) (Tympanic)   Ht 5' 10\" (1.778 m)   Wt 78.5 kg (173 lb)   SpO2 98%   BMI 24.82 kg/m²      Physical Exam  Vitals and nursing note reviewed.   Constitutional:       General: He is not in acute distress.     Appearance: Normal appearance. He is " well-developed. He is not ill-appearing or toxic-appearing.   HENT:      Head: Normocephalic and atraumatic.   Eyes:      General: No scleral icterus.     Conjunctiva/sclera: Conjunctivae normal.   Pulmonary:      Effort: Pulmonary effort is normal. No respiratory distress.   Abdominal:      General: There is no distension.      Palpations: Abdomen is soft. There is no mass.      Tenderness: There is no abdominal tenderness.   Musculoskeletal:      Right lower leg: No edema.      Left lower leg: No edema.   Skin:     General: Skin is warm and dry.      Coloration: Skin is not jaundiced.      Findings: No bruising or rash.   Neurological:      Mental Status: He is alert and oriented to person, place, and time. Mental status is at baseline.   Psychiatric:         Mood and Affect: Mood normal.         Behavior: Behavior normal.

## 2025-02-19 LAB
DSDNA IGG SERPL IA-ACNC: <0.9 IU/ML (ref ?–15)
HBV CORE AB SER QL: NORMAL
HBV CORE IGM SER QL: NORMAL
HBV SURFACE AG SER QL: NORMAL
HCV AB SER QL: NORMAL
NUCLEAR IGG SER IA-RTO: 0.2 RATIO (ref ?–1)

## 2025-02-20 LAB
ACTIN IGG SERPL-ACNC: 5 UNITS (ref 0–19)
MITOCHONDRIA M2 IGG SER-ACNC: <20 UNITS (ref 0–20)

## 2025-03-07 ENCOUNTER — HOSPITAL ENCOUNTER (OUTPATIENT)
Dept: MRI IMAGING | Facility: CLINIC | Age: 45
Discharge: HOME | End: 2025-03-07
Payer: COMMERCIAL

## 2025-03-07 DIAGNOSIS — R74.8 ELEVATED ALKALINE PHOSPHATASE LEVEL: ICD-10-CM

## 2025-03-07 PROCEDURE — 74183 MRI ABD W/O CNTR FLWD CNTR: CPT

## 2025-03-07 PROCEDURE — A9585 GADOBUTROL INJECTION: HCPCS | Performed by: FAMILY MEDICINE

## 2025-03-07 RX ORDER — GADOBUTROL 604.72 MG/ML
7 INJECTION INTRAVENOUS
Status: COMPLETED | OUTPATIENT
Start: 2025-03-07 | End: 2025-03-07

## 2025-03-07 RX ADMIN — GADOBUTROL 7 ML: 604.72 INJECTION INTRAVENOUS at 12:13

## 2025-03-10 ENCOUNTER — RESULTS FOLLOW-UP (OUTPATIENT)
Age: 45
End: 2025-03-10

## 2025-05-06 ENCOUNTER — OFFICE VISIT (OUTPATIENT)
Dept: GASTROENTEROLOGY | Facility: CLINIC | Age: 45
End: 2025-05-06
Payer: COMMERCIAL

## 2025-05-06 VITALS
HEIGHT: 70 IN | WEIGHT: 176 LBS | HEART RATE: 77 BPM | SYSTOLIC BLOOD PRESSURE: 110 MMHG | RESPIRATION RATE: 16 BRPM | TEMPERATURE: 98 F | DIASTOLIC BLOOD PRESSURE: 70 MMHG | BODY MASS INDEX: 25.2 KG/M2 | OXYGEN SATURATION: 97 %

## 2025-05-06 DIAGNOSIS — R74.8 ELEVATED ALKALINE PHOSPHATASE LEVEL: Primary | ICD-10-CM

## 2025-05-06 DIAGNOSIS — Z80.0 FAMILY HISTORY OF MALIGNANT NEOPLASM OF EXTRAHEPATIC BILE DUCTS: ICD-10-CM

## 2025-05-06 DIAGNOSIS — Z12.11 SCREENING FOR COLON CANCER: ICD-10-CM

## 2025-05-06 PROCEDURE — 99213 OFFICE O/P EST LOW 20 MIN: CPT | Performed by: FAMILY MEDICINE

## 2025-05-06 NOTE — PROGRESS NOTES
Name: Lacho Johnson      : 1980      MRN: [id number]  Encounter Provider: Brigid Romero PA-C  Encounter Date: 2025   Encounter department: [facility] [place]  :  Assessment & Plan  Elevated alkaline phosphatase level  Patient with a mild and intermittently elevated alkaline phosphatase since at least . The remainder of his liver chemistries are normal.  His alkaline phosphatase has more recently been uptrending prompting isoenzymes showing a peak alk phos of 193 and  s/o hepatic origin. He also had an RUQ US showing an unremarkable liver, gallbladder and biliary tree. +FH of bile duct cancer including a maternal aunt dx at 51 y/o. He is asymptomatic.     The etiology of his elevated alkaline phosphatase was currently unclear. No radiographic evidence of obvious hepatobiliary obstruction and no medication/herbal supplements to suggest DILI.     Since his last appointment, he had updated liver chemistries showing persistently elevated alkaline phosphatase, but overall stable. Interestingly, his GGT was normal suggesting that this may not be of hepatic origin despite his prior alkaline phophatase isoenzymes. The remainder of his workup for both viral and autoimmune hepatitis was normal/negative.  He also had an MRI/MRCP which was grossly unremarkable - particularly without hepatobiliary obstruction.    Given such mild degree of elevations, will plan to trend his liver chemistries with a repeat hepatic function panel q3 months.  Encouraged him to speak with his PCP regarding alternative, known liver related etiologies for his elevated alkaline phosphatase (parathyroid dysfunction, osteopenia/porosis, vitamin D deficiency etc). If his alkaline phosphatase were to rise then could consider a liver biopsy to exclude seronegative PBC versus small duct PSC although low suspicion.    Orders:  •  Hepatic function panel; Standing    Family history of malignant  neoplasm of extrahepatic bile ducts  Refer to A/P above.        Screening for colon cancer  Patient will be due for CRC screening July 2025.        Follow-up in 6 months or sooner if necessary.       History of Present Illness   HPI    Lacho Johnson is a 44 y.o. male  with PMH significant for HLD, a lesion of the uvula and osteoarthritis s/p partial right hip replacement who presents today for a follow-up regarding an elevated alkaline phosphatase.    Interval history   - Last seen 2/18/2025.   - Labs (2/18) showed persistently elevated but overall stable alkaline phosphatase. The remainder of his liver chemistries are within normal limits. Interestingly, GGT was normal suggesting that this may not be of hepatic origin. The remainder of his workup for both viral and autoimmune hepatitis was also normal/negative.  - MRI/MRCP (3/7/2025) was grossly unremarkable.    Extended liver history  Lacho was ordered for routine labs by his PCP and found to have a mildly elevated alkaline phosphatase. Per chart review, this has been intermittently elevated since at least 2022.  The remainder of his liver chemistries have historically been within normal limits. He has subsequent CMP which showed an uptrending alkaline phosphatase (164) prompting alk phos isoenzymes suggesting that this is of hepatic origin. He had a complete abdominal ultrasound (1/13/2025) showing a normal-appearing liver, gallbladder and biliary tree. He was incidentally noted to have a 1.3 cm right renal cortical cyst.     Denies a personal history of chronic liver disease. He has a mother w/ fatty liver and maternal aunt dx w/ bile-duct CA at 53 y/o. Denies any known past or current infection with viral hepatitis. Denies being treated for any autoimmune conditions. Denies taking any herbal supplements, performance-enhancing drugs, or OTC/prescription medications not reflected on their med list. Denies EtOH use.      Denies fevers/chills, pruritus,  confusion, dark urine, bruising easily, yellowing of the eyes and/or skin, abdominal pain or distension, overt GI bleeding or unintentional weight loss.      Started Crestor 2-3 years ago.   He was checked for heavy metals after hip replacement (11/2022) and found to have high cobalt was told this was within acceptable range.       History obtained from: patient    Review of Systems   All other systems reviewed and are negative.    Pertinent Medical History      Medical History Reviewed by provider this encounter:  Tobacco  Allergies  Meds  Problems  Med Hx  Surg Hx  Fam Hx     .      Medical History Reviewed by provider this encounter:  Tobacco  Allergies  Meds  Problems  Med Hx  Surg Hx  Fam Hx     .  Past Medical History   Past Medical History:   Diagnosis Date   • Arthritis 11/22    Right hip,  physical therapy through Oct and Nov.   • Fractures     wrist   • Hypercholesteremia      Past Surgical History:   Procedure Laterality Date   • FL INJECTION RIGHT HIP (ARTHROGRAM)  12/20/2021   • KNEE SURGERY Right     meniscus repair   • ORTHOPEDIC SURGERY  5/01    R meniscus     Family History   Problem Relation Age of Onset   • Hyperlipidemia Mother    • Heart disease Father    • Hyperlipidemia Father    • Hyperlipidemia Sister    • Hyperlipidemia Sister       reports that he has never smoked. He has never used smokeless tobacco. He reports that he does not currently use alcohol. He reports that he does not use drugs.  Current Outpatient Medications   Medication Instructions   • fluticasone (FLONASE) 50 mcg/act nasal spray 2 sprays, Daily   • melatonin 3 mg, Oral, Daily PRN   • rosuvastatin (CRESTOR) 10 MG tablet No dose, route, or frequency recorded.   No Known Allergies   Current Outpatient Medications on File Prior to Visit   Medication Sig Dispense Refill   • fluticasone (FLONASE) 50 mcg/act nasal spray 2 sprays into each nostril daily     • melatonin 3 mg Take 3 mg by mouth daily as needed     •  "rosuvastatin (CRESTOR) 10 MG tablet        No current facility-administered medications on file prior to visit.      Social History     Tobacco Use   • Smoking status: Never   • Smokeless tobacco: Never   Vaping Use   • Vaping status: Never Used   Substance and Sexual Activity   • Alcohol use: Not Currently   • Drug use: Never   • Sexual activity: Yes     Partners: Female     Birth control/protection: Other     Comment: IUD removed currently attepmting for pregnancy        Objective   /70 (BP Location: Right arm, Patient Position: Sitting, Cuff Size: Adult)   Pulse 77   Temp 98 °F (36.7 °C) (Temporal)   Resp 16   Ht 5' 10\" (1.778 m)   Wt 79.8 kg (176 lb)   SpO2 97%   BMI 25.25 kg/m²      Physical Exam  Vitals and nursing note reviewed.   Constitutional:       General: He is not in acute distress.     Appearance: Normal appearance. He is well-developed. He is not ill-appearing or toxic-appearing.   HENT:      Head: Normocephalic and atraumatic.   Eyes:      General: No scleral icterus.     Conjunctiva/sclera: Conjunctivae normal.   Pulmonary:      Effort: Pulmonary effort is normal. No respiratory distress.   Abdominal:      General: Bowel sounds are normal. There is no distension.      Palpations: Abdomen is soft. There is no mass.      Tenderness: There is no abdominal tenderness.   Musculoskeletal:      Right lower leg: No edema.      Left lower leg: No edema.   Skin:     General: Skin is warm and dry.      Coloration: Skin is not jaundiced.      Findings: No bruising or rash.   Neurological:      Mental Status: He is alert and oriented to person, place, and time. Mental status is at baseline.   Psychiatric:         Mood and Affect: Mood normal.         Behavior: Behavior normal.           "